# Patient Record
Sex: FEMALE | Race: WHITE | Employment: PART TIME | URBAN - METROPOLITAN AREA
[De-identification: names, ages, dates, MRNs, and addresses within clinical notes are randomized per-mention and may not be internally consistent; named-entity substitution may affect disease eponyms.]

---

## 2023-01-31 ENCOUNTER — HOSPITAL ENCOUNTER (EMERGENCY)
Facility: HOSPITAL | Age: 43
Discharge: HOME | End: 2023-01-31
Attending: EMERGENCY MEDICINE
Payer: MEDICAID

## 2023-01-31 ENCOUNTER — APPOINTMENT (OUTPATIENT)
Dept: CT IMAGING | Facility: HOSPITAL | Age: 43
End: 2023-01-31
Attending: EMERGENCY MEDICINE
Payer: MEDICAID

## 2023-01-31 ENCOUNTER — APPOINTMENT (OUTPATIENT)
Dept: ULTRASOUND IMAGING | Facility: HOSPITAL | Age: 43
End: 2023-01-31
Attending: EMERGENCY MEDICINE
Payer: MEDICAID

## 2023-01-31 VITALS
RESPIRATION RATE: 16 BRPM | BODY MASS INDEX: 29.82 KG/M2 | WEIGHT: 190 LBS | SYSTOLIC BLOOD PRESSURE: 149 MMHG | DIASTOLIC BLOOD PRESSURE: 93 MMHG | TEMPERATURE: 98 F | HEART RATE: 86 BPM | HEIGHT: 67 IN | OXYGEN SATURATION: 100 %

## 2023-01-31 DIAGNOSIS — R10.2 VAGINAL PAIN: Primary | ICD-10-CM

## 2023-01-31 DIAGNOSIS — N39.0 URINARY TRACT INFECTION WITHOUT HEMATURIA, SITE UNSPECIFIED: ICD-10-CM

## 2023-01-31 LAB
B-HCG UR QL: NEGATIVE
BILIRUB UR QL STRIP.AUTO: NEGATIVE
CLARITY UR REFRACT.AUTO: CLEAR
COLOR UR AUTO: YELLOW
GLUCOSE UR STRIP.AUTO-MCNC: NEGATIVE MG/DL
KETONES UR STRIP.AUTO-MCNC: NEGATIVE MG/DL
NITRITE UR QL STRIP.AUTO: NEGATIVE
PH UR STRIP.AUTO: 5.5 [PH] (ref 5–8)
PROT UR STRIP.AUTO-MCNC: NEGATIVE MG/DL
SP GR UR STRIP.AUTO: >=1.03 (ref 1–1.03)
UROBILINOGEN UR STRIP.AUTO-MCNC: 0.2 MG/DL

## 2023-01-31 PROCEDURE — 76830 TRANSVAGINAL US NON-OB: CPT | Performed by: EMERGENCY MEDICINE

## 2023-01-31 PROCEDURE — 76856 US EXAM PELVIC COMPLETE: CPT | Performed by: EMERGENCY MEDICINE

## 2023-01-31 PROCEDURE — 87088 URINE BACTERIA CULTURE: CPT | Performed by: EMERGENCY MEDICINE

## 2023-01-31 PROCEDURE — 81001 URINALYSIS AUTO W/SCOPE: CPT | Performed by: EMERGENCY MEDICINE

## 2023-01-31 PROCEDURE — 99285 EMERGENCY DEPT VISIT HI MDM: CPT

## 2023-01-31 PROCEDURE — 87591 N.GONORRHOEAE DNA AMP PROB: CPT | Performed by: EMERGENCY MEDICINE

## 2023-01-31 PROCEDURE — 87491 CHLMYD TRACH DNA AMP PROBE: CPT | Performed by: EMERGENCY MEDICINE

## 2023-01-31 PROCEDURE — 93975 VASCULAR STUDY: CPT | Performed by: EMERGENCY MEDICINE

## 2023-01-31 PROCEDURE — 87510 GARDNER VAG DNA DIR PROBE: CPT | Performed by: EMERGENCY MEDICINE

## 2023-01-31 PROCEDURE — 87186 SC STD MICRODIL/AGAR DIL: CPT | Performed by: EMERGENCY MEDICINE

## 2023-01-31 PROCEDURE — 81025 URINE PREGNANCY TEST: CPT

## 2023-01-31 PROCEDURE — 87086 URINE CULTURE/COLONY COUNT: CPT | Performed by: EMERGENCY MEDICINE

## 2023-01-31 PROCEDURE — 81015 MICROSCOPIC EXAM OF URINE: CPT | Performed by: EMERGENCY MEDICINE

## 2023-01-31 PROCEDURE — 87480 CANDIDA DNA DIR PROBE: CPT | Performed by: EMERGENCY MEDICINE

## 2023-01-31 PROCEDURE — 87660 TRICHOMONAS VAGIN DIR PROBE: CPT | Performed by: EMERGENCY MEDICINE

## 2023-01-31 PROCEDURE — 74176 CT ABD & PELVIS W/O CONTRAST: CPT | Performed by: EMERGENCY MEDICINE

## 2023-01-31 RX ORDER — CEPHALEXIN 500 MG/1
500 CAPSULE ORAL 2 TIMES DAILY
Qty: 14 CAPSULE | Refills: 0 | Status: SHIPPED | OUTPATIENT
Start: 2023-01-31 | End: 2023-02-07

## 2023-01-31 NOTE — ED INITIAL ASSESSMENT (HPI)
PT PRESENTS TO ED WITH GROIN PAIN THAT RADIATES TO VAGINAL, STATES SHE HAS URINARY FREQUENCY, RETENTION, WOKE UP 6 TIMES LAST NIGHT TO URINATE. STATES SHE HAS PRESSURE IN VAGINA WHEN SHE IS WALKING. STATES SHE FEELS LIKE SOMETHING IS IN THERE.

## 2023-02-01 LAB
C TRACH DNA SPEC QL NAA+PROBE: NEGATIVE
N GONORRHOEA DNA SPEC QL NAA+PROBE: NEGATIVE

## 2023-02-01 NOTE — ED QUICK NOTES
Assumed care of patient from [facility]. Pt voided approximately 150ml of urine prior to bladder scan. Pt denies fevers, chills. C/o pressure in lower abd and burning sensation.

## 2023-02-03 RX ORDER — METRONIDAZOLE 500 MG/1
500 TABLET ORAL 2 TIMES DAILY
Qty: 20 TABLET | Refills: 0 | Status: SHIPPED | OUTPATIENT
Start: 2023-02-03 | End: 2023-02-13

## 2023-02-03 NOTE — PROGRESS NOTES
New rx for Flagyl 500 mg PO BID x10 days sent to [facility] [place] [facility] [place] and [facility]. Patient will be contacted regarding new therapy. Addendum:  Patient informed and educated regarding test results. All questions answered at this time.

## (undated) NOTE — LETTER
Date & Time: 1/31/2023, 10:30 PM  Patient: Edmund Khanna  Encounter Provider(s):    Lilo Garnica MD       To Whom It May Concern:    Edmund Khanna was seen and treated in our department on 1/31/2023. She can return to work 02/01/2023.     If you have any questions or concerns, please do not hesitate to call.        _____________________________  Physician/[facility] Signature